# Patient Record
Sex: MALE | Race: WHITE | NOT HISPANIC OR LATINO | Employment: UNEMPLOYED | URBAN - METROPOLITAN AREA
[De-identification: names, ages, dates, MRNs, and addresses within clinical notes are randomized per-mention and may not be internally consistent; named-entity substitution may affect disease eponyms.]

---

## 2024-11-09 ENCOUNTER — HOSPITAL ENCOUNTER (INPATIENT)
Facility: MEDICAL CENTER | Age: 29
LOS: 2 days | DRG: 083 | End: 2024-11-11
Attending: EMERGENCY MEDICINE | Admitting: SURGERY
Payer: OTHER MISCELLANEOUS

## 2024-11-09 ENCOUNTER — APPOINTMENT (OUTPATIENT)
Dept: RADIOLOGY | Facility: MEDICAL CENTER | Age: 29
DRG: 083 | End: 2024-11-09
Attending: EMERGENCY MEDICINE
Payer: OTHER MISCELLANEOUS

## 2024-11-09 ENCOUNTER — APPOINTMENT (OUTPATIENT)
Dept: RADIOLOGY | Facility: MEDICAL CENTER | Age: 29
DRG: 083 | End: 2024-11-09
Payer: OTHER MISCELLANEOUS

## 2024-11-09 DIAGNOSIS — F10.929 ALCOHOLIC INTOXICATION WITH COMPLICATION (HCC): ICD-10-CM

## 2024-11-09 DIAGNOSIS — I60.9 SUBARACHNOID HEMORRHAGE (HCC): ICD-10-CM

## 2024-11-09 DIAGNOSIS — S81.811A LACERATION OF RIGHT LOWER EXTREMITY, INITIAL ENCOUNTER: ICD-10-CM

## 2024-11-09 DIAGNOSIS — T14.90XA TRAUMA: ICD-10-CM

## 2024-11-09 DIAGNOSIS — S02.0XXB OPEN FRACTURE OF PARIETAL BONE, INITIAL ENCOUNTER (HCC): ICD-10-CM

## 2024-11-09 DIAGNOSIS — T07.XXXA MULTIPLE ABRASIONS: ICD-10-CM

## 2024-11-09 DIAGNOSIS — S06.5XAA BILATERAL SUBDURAL HEMATOMAS (HCC): ICD-10-CM
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
## 2024-11-09 DIAGNOSIS — S01.01XA LACERATION OF SCALP, INITIAL ENCOUNTER: ICD-10-CM

## 2024-11-09 PROBLEM — Z72.0 TOBACCO USE: Status: ACTIVE | Noted: 2024-11-09

## 2024-11-09 PROBLEM — S81.011A KNEE LACERATION, RIGHT, INITIAL ENCOUNTER: Status: ACTIVE | Noted: 2024-11-09

## 2024-11-09 PROBLEM — Z53.09 CONTRAINDICATION TO DEEP VEIN THROMBOSIS (DVT) PROPHYLAXIS: Status: ACTIVE | Noted: 2024-11-09

## 2024-11-09 LAB
ABO + RH BLD: NORMAL
ABO GROUP BLD: NORMAL
ALBUMIN SERPL BCP-MCNC: 4.6 G/DL (ref 3.2–4.9)
ALBUMIN/GLOB SERPL: 1.6 G/DL
ALP SERPL-CCNC: 45 U/L (ref 30–99)
ALT SERPL-CCNC: 24 U/L (ref 2–50)
ANION GAP SERPL CALC-SCNC: 15 MMOL/L (ref 7–16)
APTT PPP: 23.2 SEC (ref 24.7–36)
AST SERPL-CCNC: 40 U/L (ref 12–45)
BILIRUB SERPL-MCNC: 0.3 MG/DL (ref 0.1–1.5)
BLD GP AB SCN SERPL QL: NORMAL
BUN SERPL-MCNC: 7 MG/DL (ref 8–22)
CALCIUM ALBUM COR SERPL-MCNC: 8.6 MG/DL (ref 8.5–10.5)
CALCIUM SERPL-MCNC: 9.1 MG/DL (ref 8.5–10.5)
CFT BLD TEG: 4.2 MIN (ref 4.6–9.1)
CFT P HPASE BLD TEG: 4.4 MIN (ref 4.3–8.3)
CHLORIDE SERPL-SCNC: 107 MMOL/L (ref 96–112)
CLOT ANGLE BLD TEG: 71.8 DEGREES (ref 63–78)
CLOT LYSIS 30M P MA LENFR BLD TEG: 2.6 % (ref 0–2.6)
CO2 SERPL-SCNC: 18 MMOL/L (ref 20–33)
CREAT SERPL-MCNC: 0.81 MG/DL (ref 0.5–1.4)
CT.EXTRINSIC BLD ROTEM: 1.3 MIN (ref 0.8–2.1)
ERYTHROCYTE [DISTWIDTH] IN BLOOD BY AUTOMATED COUNT: 44.4 FL (ref 35.9–50)
ETHANOL BLD-MCNC: 177 MG/DL
GFR SERPLBLD CREATININE-BSD FMLA CKD-EPI: 122 ML/MIN/1.73 M 2
GLOBULIN SER CALC-MCNC: 2.9 G/DL (ref 1.9–3.5)
GLUCOSE BLD STRIP.AUTO-MCNC: 107 MG/DL (ref 65–99)
GLUCOSE SERPL-MCNC: 130 MG/DL (ref 65–99)
HCT VFR BLD AUTO: 44.6 % (ref 42–52)
HGB BLD-MCNC: 14.9 G/DL (ref 14–18)
INR PPP: 1.1 (ref 0.87–1.13)
MCF BLD TEG: 56.7 MM (ref 52–69)
MCF.PLATELET INHIB BLD ROTEM: 18.4 MM (ref 15–32)
MCH RBC QN AUTO: 30.5 PG (ref 27–33)
MCHC RBC AUTO-ENTMCNC: 33.4 G/DL (ref 32.3–36.5)
MCV RBC AUTO: 91.2 FL (ref 81.4–97.8)
PA AA BLD-ACNC: 5.7 % (ref 0–11)
PA ADP BLD-ACNC: 32.4 % (ref 0–17)
PLATELET # BLD AUTO: 281 K/UL (ref 164–446)
PMV BLD AUTO: 10.3 FL (ref 9–12.9)
POTASSIUM SERPL-SCNC: 3.8 MMOL/L (ref 3.6–5.5)
PROT SERPL-MCNC: 7.5 G/DL (ref 6–8.2)
PROTHROMBIN TIME: 14.2 SEC (ref 12–14.6)
RBC # BLD AUTO: 4.89 M/UL (ref 4.7–6.1)
RH BLD: NORMAL
SODIUM SERPL-SCNC: 140 MMOL/L (ref 135–145)
TEG ALGORITHM TGALG: ABNORMAL
WBC # BLD AUTO: 26.6 K/UL (ref 4.8–10.8)

## 2024-11-09 PROCEDURE — 700102 HCHG RX REV CODE 250 W/ 637 OVERRIDE(OP)

## 2024-11-09 PROCEDURE — 99291 CRITICAL CARE FIRST HOUR: CPT

## 2024-11-09 PROCEDURE — G0390 TRAUMA RESPONS W/HOSP CRITI: HCPCS

## 2024-11-09 PROCEDURE — 770022 HCHG ROOM/CARE - ICU (200)

## 2024-11-09 PROCEDURE — 71045 X-RAY EXAM CHEST 1 VIEW: CPT

## 2024-11-09 PROCEDURE — 700101 HCHG RX REV CODE 250

## 2024-11-09 PROCEDURE — 36415 COLL VENOUS BLD VENIPUNCTURE: CPT

## 2024-11-09 PROCEDURE — 99291 CRITICAL CARE FIRST HOUR: CPT | Performed by: SURGERY

## 2024-11-09 PROCEDURE — 70450 CT HEAD/BRAIN W/O DYE: CPT

## 2024-11-09 PROCEDURE — A9270 NON-COVERED ITEM OR SERVICE: HCPCS

## 2024-11-09 PROCEDURE — 700117 HCHG RX CONTRAST REV CODE 255: Performed by: EMERGENCY MEDICINE

## 2024-11-09 PROCEDURE — 86900 BLOOD TYPING SEROLOGIC ABO: CPT | Mod: 91

## 2024-11-09 PROCEDURE — 86901 BLOOD TYPING SEROLOGIC RH(D): CPT | Mod: 91

## 2024-11-09 PROCEDURE — 85347 COAGULATION TIME ACTIVATED: CPT

## 2024-11-09 PROCEDURE — 85610 PROTHROMBIN TIME: CPT

## 2024-11-09 PROCEDURE — 72131 CT LUMBAR SPINE W/O DYE: CPT

## 2024-11-09 PROCEDURE — 99232 SBSQ HOSP IP/OBS MODERATE 35: CPT | Performed by: SURGERY

## 2024-11-09 PROCEDURE — 86850 RBC ANTIBODY SCREEN: CPT

## 2024-11-09 PROCEDURE — 85576 BLOOD PLATELET AGGREGATION: CPT | Mod: 91

## 2024-11-09 PROCEDURE — 73560 X-RAY EXAM OF KNEE 1 OR 2: CPT | Mod: RT

## 2024-11-09 PROCEDURE — 71260 CT THORAX DX C+: CPT

## 2024-11-09 PROCEDURE — 70486 CT MAXILLOFACIAL W/O DYE: CPT

## 2024-11-09 PROCEDURE — 72125 CT NECK SPINE W/O DYE: CPT

## 2024-11-09 PROCEDURE — 85027 COMPLETE CBC AUTOMATED: CPT

## 2024-11-09 PROCEDURE — 85730 THROMBOPLASTIN TIME PARTIAL: CPT

## 2024-11-09 PROCEDURE — 700111 HCHG RX REV CODE 636 W/ 250 OVERRIDE (IP): Mod: JZ

## 2024-11-09 PROCEDURE — 72128 CT CHEST SPINE W/O DYE: CPT

## 2024-11-09 PROCEDURE — 80053 COMPREHEN METABOLIC PANEL: CPT

## 2024-11-09 PROCEDURE — 82077 ASSAY SPEC XCP UR&BREATH IA: CPT

## 2024-11-09 PROCEDURE — 700111 HCHG RX REV CODE 636 W/ 250 OVERRIDE (IP): Mod: JZ | Performed by: SURGERY

## 2024-11-09 PROCEDURE — 700105 HCHG RX REV CODE 258

## 2024-11-09 PROCEDURE — 82962 GLUCOSE BLOOD TEST: CPT

## 2024-11-09 PROCEDURE — 85384 FIBRINOGEN ACTIVITY: CPT | Mod: 91

## 2024-11-09 RX ORDER — ONDANSETRON 4 MG/1
4 TABLET, ORALLY DISINTEGRATING ORAL EVERY 4 HOURS PRN
Status: DISCONTINUED | OUTPATIENT
Start: 2024-11-09 | End: 2024-11-11 | Stop reason: HOSPADM

## 2024-11-09 RX ORDER — SODIUM PHOSPHATE,MONO-DIBASIC 19G-7G/118
1 ENEMA (ML) RECTAL
Status: DISCONTINUED | OUTPATIENT
Start: 2024-11-09 | End: 2024-11-11 | Stop reason: HOSPADM

## 2024-11-09 RX ORDER — INSULIN LISPRO 100 [IU]/ML
1-6 INJECTION, SOLUTION INTRAVENOUS; SUBCUTANEOUS EVERY 6 HOURS
Status: DISCONTINUED | OUTPATIENT
Start: 2024-11-09 | End: 2024-11-09

## 2024-11-09 RX ORDER — AMOXICILLIN 250 MG
1 CAPSULE ORAL
Status: DISCONTINUED | OUTPATIENT
Start: 2024-11-09 | End: 2024-11-11 | Stop reason: HOSPADM

## 2024-11-09 RX ORDER — GAUZE BANDAGE 2" X 2"
100 BANDAGE TOPICAL DAILY
Status: DISCONTINUED | OUTPATIENT
Start: 2024-11-09 | End: 2024-11-11 | Stop reason: HOSPADM

## 2024-11-09 RX ORDER — HYDRALAZINE HYDROCHLORIDE 20 MG/ML
10 INJECTION INTRAMUSCULAR; INTRAVENOUS EVERY 4 HOURS PRN
Status: DISCONTINUED | OUTPATIENT
Start: 2024-11-09 | End: 2024-11-11 | Stop reason: HOSPADM

## 2024-11-09 RX ORDER — HYDROMORPHONE HYDROCHLORIDE 1 MG/ML
0.5 INJECTION, SOLUTION INTRAMUSCULAR; INTRAVENOUS; SUBCUTANEOUS
Status: DISCONTINUED | OUTPATIENT
Start: 2024-11-09 | End: 2024-11-11 | Stop reason: HOSPADM

## 2024-11-09 RX ORDER — AMOXICILLIN 250 MG
1 CAPSULE ORAL NIGHTLY
Status: DISCONTINUED | OUTPATIENT
Start: 2024-11-09 | End: 2024-11-11 | Stop reason: HOSPADM

## 2024-11-09 RX ORDER — BISACODYL 10 MG
10 SUPPOSITORY, RECTAL RECTAL
Status: DISCONTINUED | OUTPATIENT
Start: 2024-11-09 | End: 2024-11-11 | Stop reason: HOSPADM

## 2024-11-09 RX ORDER — PROCHLORPERAZINE EDISYLATE 5 MG/ML
10 INJECTION INTRAMUSCULAR; INTRAVENOUS EVERY 6 HOURS PRN
Status: DISCONTINUED | OUTPATIENT
Start: 2024-11-09 | End: 2024-11-11 | Stop reason: HOSPADM

## 2024-11-09 RX ORDER — OXYCODONE HYDROCHLORIDE 10 MG/1
10 TABLET ORAL
Status: DISCONTINUED | OUTPATIENT
Start: 2024-11-09 | End: 2024-11-11 | Stop reason: HOSPADM

## 2024-11-09 RX ORDER — OXYCODONE HYDROCHLORIDE 5 MG/1
5 TABLET ORAL
Status: DISCONTINUED | OUTPATIENT
Start: 2024-11-09 | End: 2024-11-11 | Stop reason: HOSPADM

## 2024-11-09 RX ORDER — DEXTROSE MONOHYDRATE 25 G/50ML
25 INJECTION, SOLUTION INTRAVENOUS
Status: DISCONTINUED | OUTPATIENT
Start: 2024-11-09 | End: 2024-11-09

## 2024-11-09 RX ORDER — POLYETHYLENE GLYCOL 3350 17 G/17G
1 POWDER, FOR SOLUTION ORAL 2 TIMES DAILY
Status: DISCONTINUED | OUTPATIENT
Start: 2024-11-09 | End: 2024-11-11 | Stop reason: HOSPADM

## 2024-11-09 RX ORDER — ACETAMINOPHEN 500 MG
1000 TABLET ORAL EVERY 6 HOURS
Status: DISCONTINUED | OUTPATIENT
Start: 2024-11-09 | End: 2024-11-11 | Stop reason: HOSPADM

## 2024-11-09 RX ORDER — BACITRACIN ZINC AND POLYMYXIN B SULFATE 500; 1000 [USP'U]/G; [USP'U]/G
OINTMENT TOPICAL 3 TIMES DAILY
Status: DISCONTINUED | OUTPATIENT
Start: 2024-11-09 | End: 2024-11-11 | Stop reason: HOSPADM

## 2024-11-09 RX ORDER — NICOTINE 21 MG/24HR
21 PATCH, TRANSDERMAL 24 HOURS TRANSDERMAL
Status: DISCONTINUED | OUTPATIENT
Start: 2024-11-09 | End: 2024-11-11 | Stop reason: HOSPADM

## 2024-11-09 RX ORDER — SODIUM CHLORIDE 9 MG/ML
INJECTION, SOLUTION INTRAVENOUS CONTINUOUS
Status: DISCONTINUED | OUTPATIENT
Start: 2024-11-09 | End: 2024-11-10

## 2024-11-09 RX ORDER — LEVETIRACETAM 500 MG/1
500 TABLET ORAL EVERY 12 HOURS
Status: DISCONTINUED | OUTPATIENT
Start: 2024-11-09 | End: 2024-11-11 | Stop reason: HOSPADM

## 2024-11-09 RX ORDER — ONDANSETRON 2 MG/ML
4 INJECTION INTRAMUSCULAR; INTRAVENOUS EVERY 4 HOURS PRN
Status: DISCONTINUED | OUTPATIENT
Start: 2024-11-09 | End: 2024-11-11 | Stop reason: HOSPADM

## 2024-11-09 RX ORDER — LEVETIRACETAM 500 MG/5ML
500 INJECTION, SOLUTION, CONCENTRATE INTRAVENOUS EVERY 12 HOURS
Status: DISCONTINUED | OUTPATIENT
Start: 2024-11-09 | End: 2024-11-11 | Stop reason: HOSPADM

## 2024-11-09 RX ORDER — ACETAMINOPHEN 500 MG
1000 TABLET ORAL EVERY 6 HOURS PRN
Status: DISCONTINUED | OUTPATIENT
Start: 2024-11-14 | End: 2024-11-11 | Stop reason: HOSPADM

## 2024-11-09 RX ORDER — DOCUSATE SODIUM 100 MG/1
100 CAPSULE, LIQUID FILLED ORAL 2 TIMES DAILY
Status: DISCONTINUED | OUTPATIENT
Start: 2024-11-09 | End: 2024-11-11 | Stop reason: HOSPADM

## 2024-11-09 RX ORDER — FOLIC ACID 1 MG/1
1 TABLET ORAL DAILY
Status: DISCONTINUED | OUTPATIENT
Start: 2024-11-09 | End: 2024-11-11 | Stop reason: HOSPADM

## 2024-11-09 RX ADMIN — DOCUSATE SODIUM 100 MG: 100 CAPSULE, LIQUID FILLED ORAL at 17:30

## 2024-11-09 RX ADMIN — BACITRACIN ZINC AND POLYMYXIN B SULFATE 1 EACH: 500; 10000 OINTMENT TOPICAL at 08:27

## 2024-11-09 RX ADMIN — ACETAMINOPHEN 1000 MG: 500 TABLET ORAL at 13:35

## 2024-11-09 RX ADMIN — POLYETHYLENE GLYCOL 3350 1 PACKET: 17 POWDER, FOR SOLUTION ORAL at 17:30

## 2024-11-09 RX ADMIN — PROCHLORPERAZINE EDISYLATE 10 MG: 5 INJECTION INTRAMUSCULAR; INTRAVENOUS at 10:44

## 2024-11-09 RX ADMIN — ACETAMINOPHEN 1000 MG: 500 TABLET ORAL at 17:30

## 2024-11-09 RX ADMIN — ONDANSETRON 4 MG: 2 INJECTION INTRAMUSCULAR; INTRAVENOUS at 23:48

## 2024-11-09 RX ADMIN — LEVETIRACETAM 500 MG: 500 TABLET, FILM COATED ORAL at 17:30

## 2024-11-09 RX ADMIN — NICOTINE TRANSDERMAL SYSTEM 21 MG: 21 PATCH, EXTENDED RELEASE TRANSDERMAL at 12:53

## 2024-11-09 RX ADMIN — SODIUM CHLORIDE: 9 INJECTION, SOLUTION INTRAVENOUS at 08:32

## 2024-11-09 RX ADMIN — ONDANSETRON 4 MG: 2 INJECTION INTRAMUSCULAR; INTRAVENOUS at 19:37

## 2024-11-09 RX ADMIN — ONDANSETRON 4 MG: 2 INJECTION INTRAMUSCULAR; INTRAVENOUS at 14:06

## 2024-11-09 RX ADMIN — LEVETIRACETAM 500 MG: 100 INJECTION, SOLUTION INTRAVENOUS at 08:27

## 2024-11-09 RX ADMIN — IOHEXOL 100 ML: 350 INJECTION, SOLUTION INTRAVENOUS at 05:32

## 2024-11-09 RX ADMIN — ACETAMINOPHEN 1000 MG: 500 TABLET ORAL at 23:46

## 2024-11-09 RX ADMIN — ONDANSETRON 4 MG: 2 INJECTION INTRAMUSCULAR; INTRAVENOUS at 09:33

## 2024-11-09 RX ADMIN — SODIUM CHLORIDE: 9 INJECTION, SOLUTION INTRAVENOUS at 21:06

## 2024-11-09 ASSESSMENT — COPD QUESTIONNAIRES
HAVE YOU SMOKED AT LEAST 100 CIGARETTES IN YOUR ENTIRE LIFE: YES
DURING THE PAST 4 WEEKS HOW MUCH DID YOU FEEL SHORT OF BREATH: NONE/LITTLE OF THE TIME
DO YOU EVER COUGH UP ANY MUCUS OR PHLEGM?: NO/ONLY WITH OCCASIONAL COLDS OR INFECTIONS
COPD SCREENING SCORE: 2
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
## 2024-11-09 NOTE — ED NOTES
ED Tech at bedside attempting to clean dried blood off pt's face and head but pt is cussing at him saying he is cold. Warm water provided to clean with and warm blankets provided for comfort as well.

## 2024-11-09 NOTE — ED NOTES
Pt being taken upstairs at this time by LISA RN and Tech via zachery on cardiac monitor. Pt is awake and alert, talking to staff, in no apparent distress at time of transfer. Pt's paperwork and belongings sent upstairs with pt and staff.

## 2024-11-09 NOTE — CARE PLAN
The patient is Watcher - Medium risk of patient condition declining or worsening         Progress made toward(s) clinical / shift goals:    Problem: Pain - Standard  Goal: Alleviation of pain or a reduction in pain to the patient’s comfort goal  Outcome: Progressing     Problem: Neuro Status  Goal: Neuro status will remain stable or improve  Outcome: Progressing

## 2024-11-09 NOTE — ED NOTES
Pt from CT to Red 12 via [facility] at this time. Received report from Edmond DING, Trauma RN.

## 2024-11-09 NOTE — PROGRESS NOTES
Mental status adequate for full examination?: Yes    Spine cleared (radiologically and/or clinically): Yes    REVIEW OF SYSTEMS:  Review of Systems   Unable to perform ROS: Other (Patient noncoopertive with questioning)       PHYSICAL EXAMINATION:  /73   Pulse 83   Temp 36 °C (96.8 °F)   Resp 16   Ht 1.829 m (6')   Wt 74.8 kg (165 lb)   SpO2 94%   BMI 22.38 kg/m²   Physical Exam  Vitals and nursing note reviewed.   Constitutional:       Appearance: He is not ill-appearing or toxic-appearing.      Comments: Patient verbally aggressive   HENT:      Head:      Comments: Stapled laceration     Nose: Nose normal.      Mouth/Throat:      Mouth: Mucous membranes are dry.      Comments: No evidence of malocclusion   Eyes:      General:         Right eye: No discharge.         Left eye: No discharge.      Extraocular Movements: Extraocular movements intact.      Pupils: Pupils are equal, round, and reactive to light.   Cardiovascular:      Rate and Rhythm: Normal rate.   Pulmonary:      Effort: Pulmonary effort is normal. No respiratory distress.   Chest:      Chest wall: No tenderness.   Abdominal:      General: Abdomen is flat.      Palpations: Abdomen is soft.      Tenderness: There is no abdominal tenderness. There is no guarding.   Musculoskeletal:      Cervical back: Normal range of motion. No rigidity or tenderness.      Thoracic back: No bony tenderness.      Lumbar back: No bony tenderness.      Comments: Abrasions to bilateral hands, stapled laceration to right knee   Skin:     General: Skin is warm and dry.      Capillary Refill: Capillary refill takes less than 2 seconds.   Neurological:      General: No focal deficit present.      Mental Status: He is alert and oriented to person, place, and time.      GCS: GCS eye subscore is 4. GCS verbal subscore is 5. GCS motor subscore is 6.   Psychiatric:         Mood and Affect: Affect is angry.         Speech: He is noncommunicative.         Behavior:  Behavior is combative.         LABORATORY VALUES:  Recent Labs     11/09/24 0454   WBC 26.6*   RBC 4.89   HEMOGLOBIN 14.9   HEMATOCRIT 44.6   MCV 91.2   MCH 30.5   MCHC 33.4   RDW 44.4   PLATELETCT 281   MPV 10.3     Recent Labs     11/09/24 0454   SODIUM 140   POTASSIUM 3.8   CHLORIDE 107   CO2 18*   GLUCOSE 130*   BUN 7*   CREATININE 0.81   CALCIUM 9.1     Recent Labs     11/09/24 0454   ASTSGOT 40   ALTSGPT 24   TBILIRUBIN 0.3   ALKPHOSPHAT 45   GLOBULIN 2.9   INR 1.10     Recent Labs     11/09/24 0454   APTT 23.2*   INR 1.10       IMAGING:  DX-KNEE 2- RIGHT   Final Result         1.  No acute traumatic bony injury.   2.  Air density overlying the anterior knee joint space, could be within the soft tissues, consider penetrating joint injury.      CT-TSPINE W/O PLUS RECONS   Final Result         1.  No acute traumatic bony injury of the thoracic spine.      CT-LSPINE W/O PLUS RECONS   Final Result         1.  No acute traumatic bony injury of the lumbar spine.      CT-CHEST,ABDOMEN,PELVIS WITH   Final Result         1.  No significant abnormality in thorax, abdomen and pelvis CT scan.      CT-CSPINE WITHOUT PLUS RECONS   Final Result         1.  No acute traumatic bony injury of the cervical spine is apparent.      Note: There is slight motion artifacts at C1, CT head is used for comparison to exclude fracture of C1.      CT-MAXILLOFACIAL W/O PLUS RECONS   Final Result         1.  No acute traumatic facial bony injuries identified.      CT-HEAD W/O   Final Result   Addendum (preliminary) 1 of 1   These findings were discussed with the patient's clinician, Paola Michaels,    on 11/9/2024 6:08 AM.      Final         1.  3.2 mm right frontotemporal parietal subdural hematoma.   2.  Right frontal and temporal subarachnoid hemorrhages   3.  5.2 mm left temporoparietal subdural hematoma   4.  Left temporal parietal skull fracture with 1 mm depression      Based solely on CT findings, the brain injury guideline  category is mBig 3      DX-CHEST-LIMITED (1 VIEW)   Final Result         1.  No acute cardiopulmonary disease.      CT-HEAD W/O    (Results Pending)       RAP Score Total: 5      CAGE Results: not completed Blood Alcohol>0.08: yes       Assessment complete date: 11/9/2024  Intervention: Complete. Patient response to intervention: Not interested.   Patient demonstrates understanding of intervention. Patient does not agree to follow-up.   has not been contacted. Follow up with: PCP  Total ETOH intervention time: 15 - 30 mintues      PDI Score: Not performed at time of tertiary   (Score > 23 = Psychiatry consult)    All current laboratory studies/radiology exams reviewed: Yes    Medications reconciliation has been reviewed: Yes    Completed Consultations:  Neurosurgery     Pending Consultations:  None    Newly identified diagnoses, injuries and/or co-morbidities:  None     Discussed patient condition with Family and RN.

## 2024-11-09 NOTE — ASSESSMENT & PLAN NOTE
Left temporal parietal skull fracture with 1 mm depression  Non-operative management.  Emiliano Valiente MD. Neurosurgeon. [facility].

## 2024-11-09 NOTE — ASSESSMENT & PLAN NOTE
XR with no acute traumatic bony injury. Possible penetrating joint injury. Staple closure in ICU.  11/10 Ortho recommending 24 hours of zosyn.
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
## 2024-11-09 NOTE — ED NOTES
Called TICU to let Charge RN know that pt is ready to be picked up. Will continue to monitor pt while awaiting RN arrival to transport pt upstairs.

## 2024-11-09 NOTE — ASSESSMENT & PLAN NOTE
VTE prophylaxis initially contraindicated secondary to elevated bleeding risk.  11/10 Trauma surveillance venous duplex ultrasonography ordered.  11/10 Trauma screening bilateral lower extremity venous duplex negative for above knee DVT..

## 2024-11-09 NOTE — ED NOTES
Pt still alternating between cussing and yelling at staff for trying to clean up dried blood. Will try again once he is more sober.

## 2024-11-09 NOTE — PROGRESS NOTES
4 Eyes Skin Assessment Completed by JOJO Rob and arjun espinal RN.    Head Scab, Bruising, Swelling, Redness, and Edema staples to right lac, dried blood  Ears Redness, dried blood  Nose Redness, dried blood  Mouth dried blood  Neck WDL  Breast/Chest Redness, Abrasion, and Bruising  Shoulder Blades Redness, abrasions  Spine WDL  (R) Arm/Elbow/Hand Redness, Bruising, and Abrasion  (L) Arm/Elbow/Hand Redness, Bruising, and Abrasion  Abdomen Redness  Groin WDL  Scrotum/Coccyx/Buttocks Redness  (R) Leg Redness, Bruising, and Abrasion  (L) Leg Redness, Scab, Bruising, and Abrasion  (R) Heel/Foot/Toe Redness  (L) Heel/Foot/Toe Redness          Devices In Places ECG, Blood Pressure Cuff, Pulse Ox, and SCD's      Interventions In Place Sacral Mepilex and Q2 Turns    Possible Skin Injury No    Pictures Uploaded Into Epic yes    Wound Consult Placed N/A  RN Wound Prevention Protocol Ordered Yes             Abrasions to bilateral hands, knees, right shoulder, bilateral ankles, face, right head lac staples, left knee staples,

## 2024-11-09 NOTE — ASSESSMENT & PLAN NOTE
3.2mm right frontal subdural hematoma; right frontal and temporal subarachnoid hemorrhages; 5.2mm left temporoparietal subdural hematoma.  BIG 3.  Interval head CT ordered, stable.  Non-operative management.  Post traumatic pharmacologic seizure prophylaxis for 1 week.  Speech Language Pathology cognitive evaluation  Emiliano Valiente MD. Neurosurgeon. [facility].
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
## 2024-11-09 NOTE — ED NOTES
ERP at bedside updating pt regarding test results and pending admission to hospital. C-collar removed per ERP.

## 2024-11-09 NOTE — ASSESSMENT & PLAN NOTE
Admission blood alcohol level of 177mg/dL.  PO vitamins.   Alcohol withdrawal surveillance.  SBIRT

## 2024-11-10 ENCOUNTER — APPOINTMENT (OUTPATIENT)
Dept: RADIOLOGY | Facility: MEDICAL CENTER | Age: 29
DRG: 083 | End: 2024-11-10
Payer: OTHER MISCELLANEOUS

## 2024-11-10 ENCOUNTER — PHARMACY VISIT (OUTPATIENT)
Dept: PHARMACY | Facility: MEDICAL CENTER | Age: 29
End: 2024-11-10
Payer: COMMERCIAL

## 2024-11-10 LAB
ALBUMIN SERPL BCP-MCNC: 3.9 G/DL (ref 3.2–4.9)
ALBUMIN/GLOB SERPL: 1.4 G/DL
ALP SERPL-CCNC: 40 U/L (ref 30–99)
ALT SERPL-CCNC: 19 U/L (ref 2–50)
ANION GAP SERPL CALC-SCNC: 13 MMOL/L (ref 7–16)
AST SERPL-CCNC: 33 U/L (ref 12–45)
BASOPHILS # BLD AUTO: 0.1 % (ref 0–1.8)
BASOPHILS # BLD: 0.02 K/UL (ref 0–0.12)
BILIRUB SERPL-MCNC: 0.9 MG/DL (ref 0.1–1.5)
BUN SERPL-MCNC: 14 MG/DL (ref 8–22)
CALCIUM ALBUM COR SERPL-MCNC: 9.5 MG/DL (ref 8.5–10.5)
CALCIUM SERPL-MCNC: 9.4 MG/DL (ref 8.5–10.5)
CHLORIDE SERPL-SCNC: 102 MMOL/L (ref 96–112)
CO2 SERPL-SCNC: 22 MMOL/L (ref 20–33)
CREAT SERPL-MCNC: 0.54 MG/DL (ref 0.5–1.4)
EKG IMPRESSION: NORMAL
EOSINOPHIL # BLD AUTO: 0 K/UL (ref 0–0.51)
EOSINOPHIL NFR BLD: 0 % (ref 0–6.9)
ERYTHROCYTE [DISTWIDTH] IN BLOOD BY AUTOMATED COUNT: 44 FL (ref 35.9–50)
GFR SERPLBLD CREATININE-BSD FMLA CKD-EPI: 138 ML/MIN/1.73 M 2
GLOBULIN SER CALC-MCNC: 2.8 G/DL (ref 1.9–3.5)
GLUCOSE BLD STRIP.AUTO-MCNC: 114 MG/DL (ref 65–99)
GLUCOSE SERPL-MCNC: 109 MG/DL (ref 65–99)
HCT VFR BLD AUTO: 35.9 % (ref 42–52)
HGB BLD-MCNC: 12.1 G/DL (ref 14–18)
IMM GRANULOCYTES # BLD AUTO: 0.1 K/UL (ref 0–0.11)
IMM GRANULOCYTES NFR BLD AUTO: 0.6 % (ref 0–0.9)
LYMPHOCYTES # BLD AUTO: 1.63 K/UL (ref 1–4.8)
LYMPHOCYTES NFR BLD: 9.3 % (ref 22–41)
MCH RBC QN AUTO: 30.7 PG (ref 27–33)
MCHC RBC AUTO-ENTMCNC: 33.7 G/DL (ref 32.3–36.5)
MCV RBC AUTO: 91.1 FL (ref 81.4–97.8)
MONOCYTES # BLD AUTO: 1.77 K/UL (ref 0–0.85)
MONOCYTES NFR BLD AUTO: 10.1 % (ref 0–13.4)
NEUTROPHILS # BLD AUTO: 14.07 K/UL (ref 1.82–7.42)
NEUTROPHILS NFR BLD: 79.9 % (ref 44–72)
NRBC # BLD AUTO: 0 K/UL
NRBC BLD-RTO: 0 /100 WBC (ref 0–0.2)
PLATELET # BLD AUTO: 215 K/UL (ref 164–446)
PMV BLD AUTO: 10.6 FL (ref 9–12.9)
POTASSIUM SERPL-SCNC: 4 MMOL/L (ref 3.6–5.5)
PROT SERPL-MCNC: 6.7 G/DL (ref 6–8.2)
RBC # BLD AUTO: 3.94 M/UL (ref 4.7–6.1)
SODIUM SERPL-SCNC: 137 MMOL/L (ref 135–145)
WBC # BLD AUTO: 17.6 K/UL (ref 4.8–10.8)

## 2024-11-10 PROCEDURE — A9270 NON-COVERED ITEM OR SERVICE: HCPCS

## 2024-11-10 PROCEDURE — RXMED WILLOW AMBULATORY MEDICATION CHARGE: Performed by: REGISTERED NURSE

## 2024-11-10 PROCEDURE — 770001 HCHG ROOM/CARE - MED/SURG/GYN PRIV*

## 2024-11-10 PROCEDURE — 700102 HCHG RX REV CODE 250 W/ 637 OVERRIDE(OP): Performed by: REGISTERED NURSE

## 2024-11-10 PROCEDURE — 93005 ELECTROCARDIOGRAM TRACING: CPT | Performed by: STUDENT IN AN ORGANIZED HEALTH CARE EDUCATION/TRAINING PROGRAM

## 2024-11-10 PROCEDURE — 700111 HCHG RX REV CODE 636 W/ 250 OVERRIDE (IP): Mod: JZ | Performed by: REGISTERED NURSE

## 2024-11-10 PROCEDURE — 700102 HCHG RX REV CODE 250 W/ 637 OVERRIDE(OP)

## 2024-11-10 PROCEDURE — A9270 NON-COVERED ITEM OR SERVICE: HCPCS | Performed by: REGISTERED NURSE

## 2024-11-10 PROCEDURE — 92523 SPEECH SOUND LANG COMPREHEN: CPT

## 2024-11-10 PROCEDURE — 80053 COMPREHEN METABOLIC PANEL: CPT

## 2024-11-10 PROCEDURE — 85025 COMPLETE CBC W/AUTO DIFF WBC: CPT

## 2024-11-10 PROCEDURE — 93970 EXTREMITY STUDY: CPT

## 2024-11-10 PROCEDURE — 93010 ELECTROCARDIOGRAM REPORT: CPT | Performed by: INTERNAL MEDICINE

## 2024-11-10 PROCEDURE — 82962 GLUCOSE BLOOD TEST: CPT

## 2024-11-10 PROCEDURE — 700105 HCHG RX REV CODE 258: Performed by: REGISTERED NURSE

## 2024-11-10 PROCEDURE — 700101 HCHG RX REV CODE 250

## 2024-11-10 PROCEDURE — 99233 SBSQ HOSP IP/OBS HIGH 50: CPT | Performed by: REGISTERED NURSE

## 2024-11-10 RX ORDER — LORAZEPAM 2 MG/ML
2 INJECTION INTRAMUSCULAR
Status: DISCONTINUED | OUTPATIENT
Start: 2024-11-10 | End: 2024-11-11 | Stop reason: HOSPADM

## 2024-11-10 RX ORDER — LEVETIRACETAM 500 MG/1
500 TABLET ORAL EVERY 12 HOURS
Qty: 10 TABLET | Refills: 0 | Status: SHIPPED | OUTPATIENT
Start: 2024-11-10 | End: 2024-11-15

## 2024-11-10 RX ORDER — LORAZEPAM 2 MG/1
4 TABLET ORAL
Status: DISCONTINUED | OUTPATIENT
Start: 2024-11-10 | End: 2024-11-11 | Stop reason: HOSPADM

## 2024-11-10 RX ORDER — LORAZEPAM 1 MG/1
1 TABLET ORAL EVERY 4 HOURS PRN
Status: DISCONTINUED | OUTPATIENT
Start: 2024-11-10 | End: 2024-11-11 | Stop reason: HOSPADM

## 2024-11-10 RX ORDER — LORAZEPAM 2 MG/ML
1 INJECTION INTRAMUSCULAR
Status: DISCONTINUED | OUTPATIENT
Start: 2024-11-10 | End: 2024-11-11 | Stop reason: HOSPADM

## 2024-11-10 RX ORDER — LORAZEPAM 2 MG/1
2 TABLET ORAL
Status: DISCONTINUED | OUTPATIENT
Start: 2024-11-10 | End: 2024-11-11 | Stop reason: HOSPADM

## 2024-11-10 RX ORDER — ONDANSETRON 4 MG/1
4 TABLET, ORALLY DISINTEGRATING ORAL EVERY 4 HOURS PRN
Qty: 10 TABLET | Refills: 0 | Status: SHIPPED | OUTPATIENT
Start: 2024-11-10

## 2024-11-10 RX ORDER — LORAZEPAM 2 MG/ML
1.5 INJECTION INTRAMUSCULAR
Status: DISCONTINUED | OUTPATIENT
Start: 2024-11-10 | End: 2024-11-11 | Stop reason: HOSPADM

## 2024-11-10 RX ORDER — LORAZEPAM 1 MG/1
0.5 TABLET ORAL EVERY 4 HOURS PRN
Status: DISCONTINUED | OUTPATIENT
Start: 2024-11-10 | End: 2024-11-11 | Stop reason: HOSPADM

## 2024-11-10 RX ORDER — LORAZEPAM 2 MG/ML
0.5 INJECTION INTRAMUSCULAR EVERY 4 HOURS PRN
Status: DISCONTINUED | OUTPATIENT
Start: 2024-11-10 | End: 2024-11-11 | Stop reason: HOSPADM

## 2024-11-10 RX ORDER — ACETAMINOPHEN 500 MG
1000 TABLET ORAL EVERY 6 HOURS PRN
COMMUNITY
Start: 2024-11-10

## 2024-11-10 RX ADMIN — ACETAMINOPHEN 1000 MG: 500 TABLET ORAL at 16:51

## 2024-11-10 RX ADMIN — ACETAMINOPHEN 1000 MG: 500 TABLET ORAL at 13:05

## 2024-11-10 RX ADMIN — Medication 100 MG: at 05:10

## 2024-11-10 RX ADMIN — PIPERACILLIN AND TAZOBACTAM 4.5 G: 4; .5 INJECTION, POWDER, FOR SOLUTION INTRAVENOUS at 22:17

## 2024-11-10 RX ADMIN — DOCUSATE SODIUM 100 MG: 100 CAPSULE, LIQUID FILLED ORAL at 05:10

## 2024-11-10 RX ADMIN — LORAZEPAM 0.5 MG: 1 TABLET ORAL at 22:04

## 2024-11-10 RX ADMIN — LORAZEPAM 1 MG: 2 INJECTION INTRAMUSCULAR; INTRAVENOUS at 22:32

## 2024-11-10 RX ADMIN — BACITRACIN ZINC AND POLYMYXIN B SULFATE: 500; 10000 OINTMENT TOPICAL at 22:23

## 2024-11-10 RX ADMIN — PIPERACILLIN AND TAZOBACTAM 4.5 G: 4; .5 INJECTION, POWDER, FOR SOLUTION INTRAVENOUS at 13:01

## 2024-11-10 RX ADMIN — ACETAMINOPHEN 1000 MG: 500 TABLET ORAL at 05:10

## 2024-11-10 RX ADMIN — LEVETIRACETAM 500 MG: 500 TABLET, FILM COATED ORAL at 16:52

## 2024-11-10 RX ADMIN — LEVETIRACETAM 500 MG: 500 TABLET, FILM COATED ORAL at 05:10

## 2024-11-10 RX ADMIN — THERA TABS 1 TABLET: TAB at 05:10

## 2024-11-10 RX ADMIN — PIPERACILLIN AND TAZOBACTAM 4.5 G: 4; .5 INJECTION, POWDER, FOR SOLUTION INTRAVENOUS at 16:30

## 2024-11-10 RX ADMIN — FOLIC ACID 1 MG: 1 TABLET ORAL at 05:10

## 2024-11-10 RX ADMIN — LORAZEPAM 0.5 MG: 1 TABLET ORAL at 16:51

## 2024-11-10 ASSESSMENT — LIFESTYLE VARIABLES
ORIENTATION AND CLOUDING OF SENSORIUM: ORIENTED AND CAN DO SERIAL ADDITIONS
AGITATION: *
PAROXYSMAL SWEATS: NO SWEAT VISIBLE
ANXIETY: MODERATELY ANXIOUS OR GUARDED, SO ANXIETY IS INFERRED
VISUAL DISTURBANCES: NOT PRESENT
ORIENTATION AND CLOUDING OF SENSORIUM: ORIENTED AND CAN DO SERIAL ADDITIONS
ANXIETY: MODERATELY ANXIOUS OR GUARDED, SO ANXIETY IS INFERRED
PAROXYSMAL SWEATS: NO SWEAT VISIBLE
TOTAL SCORE: 6
NAUSEA AND VOMITING: NO NAUSEA AND NO VOMITING
AGITATION: *
HEADACHE, FULLNESS IN HEAD: MILD
AUDITORY DISTURBANCES: NOT PRESENT
ANXIETY: MODERATELY ANXIOUS OR GUARDED, SO ANXIETY IS INFERRED
TOTAL SCORE: 7
NAUSEA AND VOMITING: NO NAUSEA AND NO VOMITING
HEADACHE, FULLNESS IN HEAD: NOT PRESENT
ANXIETY: MODERATELY ANXIOUS OR GUARDED, SO ANXIETY IS INFERRED
TREMOR: NO TREMOR
AUDITORY DISTURBANCES: NOT PRESENT
PAROXYSMAL SWEATS: NO SWEAT VISIBLE
TOTAL SCORE: 9
AUDITORY DISTURBANCES: NOT PRESENT
VISUAL DISTURBANCES: NOT PRESENT
PAROXYSMAL SWEATS: NO SWEAT VISIBLE
AGITATION: MODERATELY FIDGETY AND RESTLESS
TOTAL SCORE: 12
VISUAL DISTURBANCES: NOT PRESENT
VISUAL DISTURBANCES: NOT PRESENT
TREMOR: NO TREMOR
ORIENTATION AND CLOUDING OF SENSORIUM: ORIENTED AND CAN DO SERIAL ADDITIONS
HEADACHE, FULLNESS IN HEAD: NOT PRESENT
TREMOR: NO TREMOR
TREMOR: NO TREMOR
AGITATION: *
NAUSEA AND VOMITING: *
AUDITORY DISTURBANCES: NOT PRESENT
ORIENTATION AND CLOUDING OF SENSORIUM: ORIENTED AND CAN DO SERIAL ADDITIONS
NAUSEA AND VOMITING: MILD NAUSEA WITH NO VOMITING
HEADACHE, FULLNESS IN HEAD: NOT PRESENT

## 2024-11-10 ASSESSMENT — ENCOUNTER SYMPTOMS
FOCAL WEAKNESS: 0
PSYCHIATRIC NEGATIVE: 1
CARDIOVASCULAR NEGATIVE: 1
EYES NEGATIVE: 1
MUSCULOSKELETAL NEGATIVE: 1
BLURRED VISION: 0
NEUROLOGICAL NEGATIVE: 1
DOUBLE VISION: 0
GASTROINTESTINAL NEGATIVE: 1
RESPIRATORY NEGATIVE: 1
DIZZINESS: 0
CONSTITUTIONAL NEGATIVE: 1

## 2024-11-10 ASSESSMENT — PAIN DESCRIPTION - PAIN TYPE
TYPE: ACUTE PAIN

## 2024-11-10 ASSESSMENT — FIBROSIS 4 INDEX: FIB4 SCORE: 1.1

## 2024-11-10 NOTE — PROGRESS NOTES
Neurosurgery Progress Note    Subjective:  No events overnight the patient is getting slightly agitated    Exam:  Neuro intact no deficits    BP  Min: 108/52  Max: 144/71  Pulse  Av.2  Min: 60  Max: 118  Resp  Av.1  Min: 5  Max: 47  Temp  Av.7 °C (98.1 °F)  Min: 36.1 °C (97 °F)  Max: 37.3 °C (99.2 °F)  SpO2  Av %  Min: 89 %  Max: 98 %    No data recorded    Recent Labs     11/09/24  0454 11/10/24  0353   WBC 26.6* 17.6*   RBC 4.89 3.94*   HEMOGLOBIN 14.9 12.1*   HEMATOCRIT 44.6 35.9*   MCV 91.2 91.1   MCH 30.5 30.7   MCHC 33.4 33.7   RDW 44.4 44.0   PLATELETCT 281 215   MPV 10.3 10.6     Recent Labs     11/09/24  0454 11/10/24  0353   SODIUM 140 137   POTASSIUM 3.8 4.0   CHLORIDE 107 102   CO2 18* 22   GLUCOSE 130* 109*   BUN 7* 14   CREATININE 0.81 0.54   CALCIUM 9.1 9.4     Recent Labs     24   APTT 23.2*   INR 1.10     Recent Labs     24   REACTMIN 4.2*   CLOTKINET 1.3   CLOTANGL 71.8   MAXCLOTS 56.7   [id number] 2.6   PRCINADP 32.4*   PRCINAA 5.7       Intake/Output                         24 0700 - 11/10/24 0659 11/10/24 0700 - 24 0659      9071-7336 Total 6940-1962 1198-2717 Total                 Intake    P.O.  480  -- 480  --  -- --    P.O. 480 -- 480 -- -- --    I.V.  895.6  888.4 1784  --  -- --    Volume (mL) (NS infusion) 895.6 888.4 1784 -- -- --    Total Intake 1375.6 888.4 2264 -- -- --       Output    Urine  850  600 1450  1250  -- 1250    Number of Times Voided -- 2 x 2 x 2 x -- 2 x    Urine Void (mL)  1250 -- 1250    Stool  --  -- --  --  -- --    Number of Times Stooled -- 0 x 0 x -- -- --    Total Output  1250 -- 1250       Net I/O     525.6 288.4 814 -1250 -- -1250              Intake/Output Summary (Last 24 hours) at 11/10/2024 1021  Last data filed at 11/10/2024 0938  Gross per 24 hour   Intake 2264.01 ml   Output 2400 ml   Net -135.99 ml             Respiratory Therapy Consult   Continuous RT    Pharmacy  Consult Request  1 Each PHARMACY TO DOSE    ondansetron  4 mg Q4HRS PRN    ondansetron  4 mg Q4HRS PRN    docusate sodium  100 mg BID    senna-docusate  1 Tablet Nightly    senna-docusate  1 Tablet Q24HRS PRN    polyethylene glycol/lytes  1 Packet BID    magnesium hydroxide  30 mL DAILY AT 1800    bisacodyl  10 mg Q24HRS PRN    sodium phosphate  1 Each Once PRN    acetaminophen  1,000 mg Q6HRS    Followed by    [START ON 11/14/2024] acetaminophen  1,000 mg Q6HRS PRN    oxyCODONE immediate-release  5 mg Q3HRS PRN    Or    oxyCODONE immediate-release  10 mg Q3HRS PRN    Or    HYDROmorphone  0.5 mg Q3HRS PRN    hydrALAZINE  10 mg Q4HRS PRN    levETIRAcetam  500 mg Q12HRS    Or    levETIRAcetam (Keppra) IV  500 mg Q12HRS    bacitracin-polymyxin b   TID    thiamine  100 mg DAILY    And    multivitamin  1 Tablet DAILY    And    folic acid  1 mg DAILY    prochlorperazine  10 mg Q6HRS PRN    nicotine  21 mg Daily-0600       Assessment and Plan:  Hospital day # 1  POD# na  Chemical prophylactic DVT therapy: No  Start date/time: tbd     Brain Compression: No    CT head this morning shows stability of exam  Patient's exam and CT scan are stable  See Keppra 500 twice daily for total 7 days  Okay for DVT prophylaxis starting tomorrow  No systemic anticoagulation for a total of 10 days  Systolic blood pressure less than 160  Okay for every 4 hours neurochecks PT OT once cleared by trauma he can be transferred to the floor or discharged home.    Total 35 minutes direct patient care coronation consultation evaluation

## 2024-11-10 NOTE — PROGRESS NOTES
1440: pt transferred to unit with transport and RN marvin. Pt agitated, impulsive. Pt keeps asking to go home. Pt's mom at bedside.    1514: pt agitated, restless. Notified SHONA Reeves and requested medications for agitation and alcohol withdrawal. Order received. Will CTM
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
## 2024-11-10 NOTE — CARE PLAN
The patient is Stable - Low risk of patient condition declining or worsening    Shift Goals  Clinical Goals: Stable Q2 Neuro Exams  Patient Goals: Sleep  Family Goals: Updates    Progress made toward(s) clinical / shift goals:    Problem: Pain - Standard  Goal: Alleviation of pain or a reduction in pain to the patient’s comfort goal  Description: Target End Date:  Prior to discharge or change in level of care    Document on Vitals flowsheet    1.  Document pain using the appropriate pain scale per order or unit policy  2.  Educate and implement non-pharmacologic comfort measures (i.e. relaxation, distraction, massage, cold/heat therapy, etc.)  3.  Pain management medications as ordered  4.  Reassess pain after pain med administration per policy  5.  If opiods administered assess patient's response to pain medication is appropriate per POSS sedation scale  6.  Follow pain management plan developed in collaboration with patient and interdisciplinary team (including palliative care or pain specialists if applicable)  Outcome: Progressing     Problem: Neuro Status  Goal: Neuro status will remain stable or improve  Description: Target End Date:  Prior to discharge or change in level of care    Document on Neuro assessment in the Assessment flowsheet    1.  Assess and monitor neurologic status per provider order/protocol/unit policy  2.  Assess level of consciousness and orientation  3.  Assess for speech, dysarthria, dysphagia, facial symmetry  4.  Assess visual field, eye movements, gaze preference, pupil reaction and size  5.  Assess muscle strength and motor response in all four extremities  6.  Assess for sensation (numbness and tingling)  7.  Assess basic neuro reflexes (cough, gag, corneal)  8.  Identify changes in neuro status and report to provider for testing/treatment orders  Outcome: Progressing

## 2024-11-10 NOTE — DISCHARGE INSTRUCTIONS
- Call or seek medical attention for questions or concerns  - Follow up with the [facility] RETURN: as needed.  - Follow up with Dr. Valiente in as needed, avoid all blood thinners including aspirin or NSAIDs (ibuprophen, Advil, Aleve, Motrin) for at least two weeks  - Follow up with primary care provider within one weeks time  - Resume regular diet  - May take over the counter acetaminophen   - Continue daily over the counter stool softener while on narcotics  - No operation of machinery or motorized vehicles while under the influence of narcotics  - No alcohol, marijuana or illicit drug use while under the influence of narcotics  - In the event of a narcotic overdose naloxone (Narcan) is available without a prescription from any [facility] or [facility]  - No swimming, hot tubs, baths or wound submersion until cleared by outpatient provider. May shower  - No contact sports, strenuous activities, or heavy lifting until cleared by outpatient provider  - If respiratory decompensation, persistent or worsening pain, or signs or symptoms of infection occur seek medical attention
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
## 2024-11-10 NOTE — PROGRESS NOTES
Patient's mom requested bed alarm be turned off while patient was sleeping, said that they would inform if they leave the room

## 2024-11-10 NOTE — THERAPY
Speech Language Therapy Contact Note    Patient Name: Levon Sanford  Age:  29 y.o., Sex:  male  Medical Record #: [id number]  Today's Date: 11/10/2024       11/10/24 0915   Treatment Variance   Reason For Missed Therapy Non-Medical - Patient Refused   Initial Contact Note    Initial Contact Note  Order Received and Verified, Speech Therapy Evaluation in Progress with Full Report to Follow.   Interdisciplinary Plan of Care Collaboration   IDT Collaboration with  Nursing;Nurse Practitioner   Collaboration Comments Attempted to see patient for cognitive evaluation. Pt opened his eyes briefly, answered his name, and then went back sleep. Attempted several times to re-awaken patient and he would open eyes then close them again. No response to SLP education on the purpose of cognitive evaluation in discharge planning.     - Shilpa Morton, SLP

## 2024-11-11 VITALS
HEART RATE: 58 BPM | HEIGHT: 72 IN | BODY MASS INDEX: 20.72 KG/M2 | WEIGHT: 153 LBS | SYSTOLIC BLOOD PRESSURE: 116 MMHG | DIASTOLIC BLOOD PRESSURE: 58 MMHG | TEMPERATURE: 98.6 F | RESPIRATION RATE: 18 BRPM | OXYGEN SATURATION: 96 %

## 2024-11-11 LAB
ALBUMIN SERPL BCP-MCNC: 3.9 G/DL (ref 3.2–4.9)
ALBUMIN/GLOB SERPL: 1.3 G/DL
ALP SERPL-CCNC: 39 U/L (ref 30–99)
ALT SERPL-CCNC: 19 U/L (ref 2–50)
ANION GAP SERPL CALC-SCNC: 11 MMOL/L (ref 7–16)
AST SERPL-CCNC: 23 U/L (ref 12–45)
BASOPHILS # BLD AUTO: 0.1 % (ref 0–1.8)
BASOPHILS # BLD: 0.02 K/UL (ref 0–0.12)
BILIRUB SERPL-MCNC: 0.7 MG/DL (ref 0.1–1.5)
BUN SERPL-MCNC: 14 MG/DL (ref 8–22)
CALCIUM ALBUM COR SERPL-MCNC: 9.5 MG/DL (ref 8.5–10.5)
CALCIUM SERPL-MCNC: 9.4 MG/DL (ref 8.5–10.5)
CHLORIDE SERPL-SCNC: 102 MMOL/L (ref 96–112)
CO2 SERPL-SCNC: 24 MMOL/L (ref 20–33)
CREAT SERPL-MCNC: 0.74 MG/DL (ref 0.5–1.4)
EOSINOPHIL # BLD AUTO: 0 K/UL (ref 0–0.51)
EOSINOPHIL NFR BLD: 0 % (ref 0–6.9)
ERYTHROCYTE [DISTWIDTH] IN BLOOD BY AUTOMATED COUNT: 43.2 FL (ref 35.9–50)
GFR SERPLBLD CREATININE-BSD FMLA CKD-EPI: 125 ML/MIN/1.73 M 2
GLOBULIN SER CALC-MCNC: 2.9 G/DL (ref 1.9–3.5)
GLUCOSE SERPL-MCNC: 107 MG/DL (ref 65–99)
HCT VFR BLD AUTO: 34.2 % (ref 42–52)
HGB BLD-MCNC: 11.8 G/DL (ref 14–18)
IMM GRANULOCYTES # BLD AUTO: 0.06 K/UL (ref 0–0.11)
IMM GRANULOCYTES NFR BLD AUTO: 0.4 % (ref 0–0.9)
LYMPHOCYTES # BLD AUTO: 2.06 K/UL (ref 1–4.8)
LYMPHOCYTES NFR BLD: 12.5 % (ref 22–41)
MAGNESIUM SERPL-MCNC: 2 MG/DL (ref 1.5–2.5)
MCH RBC QN AUTO: 31.4 PG (ref 27–33)
MCHC RBC AUTO-ENTMCNC: 34.5 G/DL (ref 32.3–36.5)
MCV RBC AUTO: 91 FL (ref 81.4–97.8)
MONOCYTES # BLD AUTO: 1.51 K/UL (ref 0–0.85)
MONOCYTES NFR BLD AUTO: 9.1 % (ref 0–13.4)
NEUTROPHILS # BLD AUTO: 12.87 K/UL (ref 1.82–7.42)
NEUTROPHILS NFR BLD: 77.9 % (ref 44–72)
NRBC # BLD AUTO: 0 K/UL
NRBC BLD-RTO: 0 /100 WBC (ref 0–0.2)
PHOSPHATE SERPL-MCNC: 3.9 MG/DL (ref 2.5–4.5)
PLATELET # BLD AUTO: 200 K/UL (ref 164–446)
PMV BLD AUTO: 10.9 FL (ref 9–12.9)
POTASSIUM SERPL-SCNC: 3.7 MMOL/L (ref 3.6–5.5)
PROT SERPL-MCNC: 6.8 G/DL (ref 6–8.2)
RBC # BLD AUTO: 3.76 M/UL (ref 4.7–6.1)
SODIUM SERPL-SCNC: 137 MMOL/L (ref 135–145)
WBC # BLD AUTO: 16.5 K/UL (ref 4.8–10.8)

## 2024-11-11 PROCEDURE — 700105 HCHG RX REV CODE 258: Performed by: REGISTERED NURSE

## 2024-11-11 PROCEDURE — 700102 HCHG RX REV CODE 250 W/ 637 OVERRIDE(OP)

## 2024-11-11 PROCEDURE — 80053 COMPREHEN METABOLIC PANEL: CPT

## 2024-11-11 PROCEDURE — 83735 ASSAY OF MAGNESIUM: CPT

## 2024-11-11 PROCEDURE — 85025 COMPLETE CBC W/AUTO DIFF WBC: CPT

## 2024-11-11 PROCEDURE — 700111 HCHG RX REV CODE 636 W/ 250 OVERRIDE (IP): Performed by: REGISTERED NURSE

## 2024-11-11 PROCEDURE — 700111 HCHG RX REV CODE 636 W/ 250 OVERRIDE (IP): Mod: JZ

## 2024-11-11 PROCEDURE — 36415 COLL VENOUS BLD VENIPUNCTURE: CPT

## 2024-11-11 PROCEDURE — A9270 NON-COVERED ITEM OR SERVICE: HCPCS

## 2024-11-11 PROCEDURE — 84100 ASSAY OF PHOSPHORUS: CPT

## 2024-11-11 PROCEDURE — 99239 HOSP IP/OBS DSCHRG MGMT >30: CPT | Performed by: NURSE PRACTITIONER

## 2024-11-11 RX ADMIN — ACETAMINOPHEN 1000 MG: 500 TABLET ORAL at 05:02

## 2024-11-11 RX ADMIN — Medication 100 MG: at 05:01

## 2024-11-11 RX ADMIN — LORAZEPAM 0.5 MG: 2 INJECTION INTRAMUSCULAR; INTRAVENOUS at 00:44

## 2024-11-11 RX ADMIN — FOLIC ACID 1 MG: 1 TABLET ORAL at 05:01

## 2024-11-11 RX ADMIN — THERA TABS 1 TABLET: TAB at 05:01

## 2024-11-11 RX ADMIN — PIPERACILLIN AND TAZOBACTAM 4.5 G: 4; .5 INJECTION, POWDER, FOR SOLUTION INTRAVENOUS at 05:10

## 2024-11-11 RX ADMIN — LEVETIRACETAM 500 MG: 500 TABLET, FILM COATED ORAL at 05:02

## 2024-11-11 RX ADMIN — ONDANSETRON 4 MG: 2 INJECTION INTRAMUSCULAR; INTRAVENOUS at 02:16

## 2024-11-11 ASSESSMENT — LIFESTYLE VARIABLES
VISUAL DISTURBANCES: NOT PRESENT
AGITATION: *
TOTAL SCORE: 4
AUDITORY DISTURBANCES: NOT PRESENT
AGITATION: NORMAL ACTIVITY
ANXIETY: MILDLY ANXIOUS
PAROXYSMAL SWEATS: NO SWEAT VISIBLE
AUDITORY DISTURBANCES: NOT PRESENT
NAUSEA AND VOMITING: NO NAUSEA AND NO VOMITING
HEADACHE, FULLNESS IN HEAD: NOT PRESENT
NAUSEA AND VOMITING: NO NAUSEA AND NO VOMITING
VISUAL DISTURBANCES: NOT PRESENT
TOTAL SCORE: 8
ANXIETY: *
NAUSEA AND VOMITING: MILD NAUSEA WITH NO VOMITING
ORIENTATION AND CLOUDING OF SENSORIUM: ORIENTED AND CAN DO SERIAL ADDITIONS
ANXIETY: *
TREMOR: NO TREMOR
TOTAL SCORE: 1
TREMOR: NO TREMOR
AGITATION: *
PAROXYSMAL SWEATS: NO SWEAT VISIBLE
AUDITORY DISTURBANCES: NOT PRESENT
ORIENTATION AND CLOUDING OF SENSORIUM: ORIENTED AND CAN DO SERIAL ADDITIONS
TREMOR: NO TREMOR
HEADACHE, FULLNESS IN HEAD: VERY MILD
HEADACHE, FULLNESS IN HEAD: NOT PRESENT
ORIENTATION AND CLOUDING OF SENSORIUM: ORIENTED AND CAN DO SERIAL ADDITIONS
VISUAL DISTURBANCES: NOT PRESENT
PAROXYSMAL SWEATS: NO SWEAT VISIBLE

## 2024-11-11 ASSESSMENT — PAIN DESCRIPTION - PAIN TYPE
TYPE: ACUTE PAIN

## 2024-11-11 NOTE — PROGRESS NOTES
Family requested that patient shower. Patient refusing to shower at this time.Says he just wants to sleep and be left alone. RN suggested to patient we would try again in the morning.
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
## 2024-11-11 NOTE — PROGRESS NOTES
"Patient refusing all stool softeners/laxatives. Says he does not need them and \"is tired of everyone trying to give them to him\", patient educated on risks of bowel obstruction. Pt verbalizes understanding.  "

## 2024-11-11 NOTE — CARE PLAN
The patient is Stable - Low risk of patient condition declining or worsening    Shift Goals  Clinical Goals: ciwa protocol  Patient Goals: go home  Family Goals: patient safety,remain at hospital for abx treatment    Progress made toward(s) clinical / shift goals:  Patient received CIWA assessments are ordered per protocol and medicated appropriately per mar. Padded bedrails,suction at bedside in place.    Problem: Optimal Care for Alcohol Withdrawal  Goal: Optimal Care for the alcohol withdrawal patient  11/11/2024 0305 by Uzma Campbell R.N.  Outcome: Progressing    Problem: Seizure Precautions  Goal: Implementation of seizure precautions  11/11/2024 0305 by Uzma Campbell R.N.  Outcome: Progressing    Patient is not progressing towards the following goals: Patient still extremely anxious and verbalizing intense desire to go home. Understands that he needs to receive abx therapy through last zosyn administration at 0500 on 11/11 before he can discharge. Patient has had 2 episodes of vomiting this evening and continued nausea. Medication ordered per mar to alleviate nausea and ice chips provided.      Problem: Psychosocial  Goal: Patient's level of anxiety will decrease  Outcome: Not Progressing     Problem: Psychosocial  Goal: Patient's level of anxiety will decrease  Outcome: Not Progressing     Problem: Gastrointestinal Irritability  Goal: Nausea and vomiting will be absent or improve  Outcome: Not Progressing

## 2024-11-11 NOTE — CARE PLAN
The patient is Stable - Low risk of patient condition declining or worsening    Shift Goals  Clinical Goals: monitor for seizure and alcohol withdrawal  Patient Goals: go home  Family Goals: safety and stay at hospital for antibiotic    Progress made toward(s) clinical / shift goals:    Problem: Knowledge Deficit - Standard  Goal: Patient and family/care givers will demonstrate understanding of plan of care, disease process/condition, diagnostic tests and medications  Outcome: Progressing     Problem: Optimal Care for Alcohol Withdrawal  Goal: Optimal Care for the alcohol withdrawal patient  Outcome: Progressing     Problem: Seizure Precautions  Goal: Implementation of seizure precautions  Outcome: Progressing     Problem: Lifestyle Changes  Goal: Patient's ability to identify lifestyle changes and available resources to help reduce recurrence of condition will improve  Outcome: Progressing     Problem: Psychosocial  Goal: Patient's level of anxiety will decrease  Outcome: Progressing     Problem: Risk for Aspiration  Goal: Patient's risk for aspiration will be absent or decrease  Outcome: Progressing     Problem: Pain - Standard  Goal: Alleviation of pain or a reduction in pain to the patient’s comfort goal  Outcome: Progressing     Problem: Neuro Status  Goal: Neuro status will remain stable or improve  Outcome: Progressing     Problem: Skin Integrity  Goal: Skin integrity is maintained or improved  Outcome: Progressing       Patient is not progressing towards the following goals:

## 2024-11-13 LAB — COMPONENT CELLULAR 8504CLL: NORMAL
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
